# Patient Record
Sex: FEMALE | Race: WHITE | NOT HISPANIC OR LATINO
[De-identification: names, ages, dates, MRNs, and addresses within clinical notes are randomized per-mention and may not be internally consistent; named-entity substitution may affect disease eponyms.]

---

## 2017-07-10 ENCOUNTER — TRANSCRIPTION ENCOUNTER (OUTPATIENT)
Age: 52
End: 2017-07-10

## 2018-11-18 ENCOUNTER — TRANSCRIPTION ENCOUNTER (OUTPATIENT)
Age: 53
End: 2018-11-18

## 2018-12-15 ENCOUNTER — TRANSCRIPTION ENCOUNTER (OUTPATIENT)
Age: 53
End: 2018-12-15

## 2019-04-21 ENCOUNTER — TRANSCRIPTION ENCOUNTER (OUTPATIENT)
Age: 54
End: 2019-04-21

## 2019-06-06 ENCOUNTER — EMERGENCY (EMERGENCY)
Facility: HOSPITAL | Age: 54
LOS: 1 days | Discharge: HOME | End: 2019-06-06
Attending: EMERGENCY MEDICINE | Admitting: EMERGENCY MEDICINE
Payer: COMMERCIAL

## 2019-06-06 VITALS
RESPIRATION RATE: 18 BRPM | SYSTOLIC BLOOD PRESSURE: 126 MMHG | OXYGEN SATURATION: 98 % | HEART RATE: 76 BPM | DIASTOLIC BLOOD PRESSURE: 85 MMHG | TEMPERATURE: 98 F

## 2019-06-06 VITALS
DIASTOLIC BLOOD PRESSURE: 81 MMHG | OXYGEN SATURATION: 96 % | WEIGHT: 134.04 LBS | TEMPERATURE: 98 F | HEIGHT: 64 IN | RESPIRATION RATE: 16 BRPM | SYSTOLIC BLOOD PRESSURE: 130 MMHG | HEART RATE: 78 BPM

## 2019-06-06 PROCEDURE — 99283 EMERGENCY DEPT VISIT LOW MDM: CPT | Mod: 25

## 2019-06-06 PROCEDURE — 99283 EMERGENCY DEPT VISIT LOW MDM: CPT

## 2019-06-06 RX ORDER — DIAZEPAM 5 MG
5 TABLET ORAL ONCE
Refills: 0 | Status: DISCONTINUED | OUTPATIENT
Start: 2019-06-06 | End: 2019-06-06

## 2019-06-06 RX ORDER — ORPHENADRINE CITRATE 100 MG
1 TABLET, EXTENDED RELEASE ORAL
Qty: 14 | Refills: 0
Start: 2019-06-06 | End: 2019-06-12

## 2019-06-06 RX ORDER — ACETAMINOPHEN 500 MG
975 TABLET ORAL ONCE
Refills: 0 | Status: COMPLETED | OUTPATIENT
Start: 2019-06-06 | End: 2019-06-06

## 2019-06-06 RX ORDER — CYCLOBENZAPRINE HYDROCHLORIDE 10 MG/1
1 TABLET, FILM COATED ORAL
Qty: 10 | Refills: 0
Start: 2019-06-06 | End: 2019-06-10

## 2019-06-06 RX ADMIN — Medication 5 MILLIGRAM(S): at 08:14

## 2019-06-06 RX ADMIN — Medication 975 MILLIGRAM(S): at 08:13

## 2019-06-06 NOTE — ED PROVIDER NOTE - MUSCULOSKELETAL MINIMAL EXAM
tender to palpation over L rotator cuff and L trapezius/TENDERNESS/normal range of motion/MUSCLE SPASMS

## 2019-06-06 NOTE — ED PROVIDER NOTE - OBJECTIVE STATEMENT
53 y f no sig PMhx p/w L shoulder pain after her dog pulled her L arm while she was holding the leash. Pt has been unable to sleep much last night due to worsening pain

## 2019-06-06 NOTE — ED PROVIDER NOTE - NSFOLLOWUPINSTRUCTIONS_ED_ALL_ED_FT
Please take motrin and tylenol every 6 hrs alternating over the new 2-3 days to help with pain and inflammation in your shoulder muscles    You will still be sore tomorrow and even the next day, that is to be expected, and is not an emergency

## 2019-06-06 NOTE — ED ADULT NURSE NOTE - OBJECTIVE STATEMENT
54 y/o female, hx anxiety, insomnia   presents to the ED ambulatory from home c/o  Left shoulder pain . Pt states that she walked the dog last night and the dog pull her hard. Denies fever, chills, n/v, weakness, abd pain, diarrhea/constipation, numbness/tingling, urinary issues . Pt A&Ox3, in no respiratory distress, no CP, PT safety maintained  at bedside, call bell within reach and bed in the lowest position. arm sling placed. 54 y/o female, hx anxiety, insomnia   presents to the ED ambulatory from home c/o  Left shoulder pain . Pt states that she walked the dog last night and the dog pull her hard. no deformities, swelling,  redness at  the left shoulder.  Denies fever, chills, n/v, weakness, abd pain, diarrhea/constipation, numbness/tingling, urinary issues . Pt A&Ox3, in no respiratory distress, no CP, PT safety maintained  at bedside, call bell within reach and bed in the lowest position. arm sling placed.

## 2019-06-06 NOTE — ED ADULT NURSE NOTE - NSIMPLEMENTINTERV_GEN_ALL_ED
Implemented All Universal Safety Interventions:  [place] to call system. Call bell, personal items and telephone within reach. Instruct patient to call for assistance. Room bathroom lighting operational. Non-slip footwear when patient is off stretcher. Physically safe environment: no spills, clutter or unnecessary equipment. Stretcher in lowest position, wheels locked, appropriate side rails in place.

## 2019-06-06 NOTE — ED ADULT NURSE NOTE - TEMPLATE LIST FOR HEAD TO TOE ASSESSMENT
Levothyroxine 75 mcg     Last Written Prescription Date: 9/13/16  Last Quantity: 30, # refills: 11  Last Office Visit with [facility] or [facility] prescribing provider: 9/13/16        TSH   Date Value Ref Range Status   09/13/2016 2.16 0.40 - 4.00 mU/L Final     HCTZ 25 mg tab      Last Written Prescription Date: 9/13/16  Last Fill Quantity: 30, # refills: 11  Last Office Visit with [facility] or [facility] prescribing provider: 9/13/16       Potassium   Date Value Ref Range Status   09/13/2016 3.9 3.4 - 5.3 mmol/L Final     Creatinine   Date Value Ref Range Status   09/13/2016 0.79 0.52 - 1.04 mg/dL Final     BP Readings from Last 3 Encounters:   09/13/16 134/82        General

## 2021-06-30 ENCOUNTER — RESULT REVIEW (OUTPATIENT)
Age: 56
End: 2021-06-30

## 2021-07-19 ENCOUNTER — APPOINTMENT (OUTPATIENT)
Dept: ULTRASOUND IMAGING | Facility: CLINIC | Age: 56
End: 2021-07-19
Payer: COMMERCIAL

## 2021-07-19 ENCOUNTER — APPOINTMENT (OUTPATIENT)
Dept: MAMMOGRAPHY | Facility: CLINIC | Age: 56
End: 2021-07-19
Payer: COMMERCIAL

## 2021-07-19 PROCEDURE — 77063 BREAST TOMOSYNTHESIS BI: CPT

## 2021-07-19 PROCEDURE — 76641 ULTRASOUND BREAST COMPLETE: CPT | Mod: 50

## 2021-07-19 PROCEDURE — 77067 SCR MAMMO BI INCL CAD: CPT

## 2021-09-13 ENCOUNTER — TRANSCRIPTION ENCOUNTER (OUTPATIENT)
Age: 56
End: 2021-09-13

## 2023-08-18 ENCOUNTER — APPOINTMENT (OUTPATIENT)
Dept: HUMAN REPRODUCTION | Facility: CLINIC | Age: 58
End: 2023-08-18

## 2024-06-24 ENCOUNTER — NON-APPOINTMENT (OUTPATIENT)
Age: 59
End: 2024-06-24

## 2024-09-06 ENCOUNTER — APPOINTMENT (OUTPATIENT)
Dept: MAMMOGRAPHY | Facility: CLINIC | Age: 59
End: 2024-09-06
Payer: COMMERCIAL

## 2024-09-06 PROCEDURE — 77063 BREAST TOMOSYNTHESIS BI: CPT

## 2024-09-06 PROCEDURE — 77067 SCR MAMMO BI INCL CAD: CPT

## 2024-09-10 ENCOUNTER — APPOINTMENT (OUTPATIENT)
Dept: RADIOLOGY | Facility: CLINIC | Age: 59
End: 2024-09-10
Payer: COMMERCIAL

## 2024-09-10 PROCEDURE — 77080 DXA BONE DENSITY AXIAL: CPT

## 2024-10-27 ENCOUNTER — NON-APPOINTMENT (OUTPATIENT)
Age: 59
End: 2024-10-27

## 2024-10-29 ENCOUNTER — APPOINTMENT (OUTPATIENT)
Dept: PLASTIC SURGERY | Facility: CLINIC | Age: 59
End: 2024-10-29
Payer: COMMERCIAL

## 2024-10-29 PROCEDURE — 99204 OFFICE O/P NEW MOD 45 MIN: CPT

## 2025-01-14 ENCOUNTER — NON-APPOINTMENT (OUTPATIENT)
Age: 60
End: 2025-01-14